# Patient Record
Sex: MALE | Race: WHITE | NOT HISPANIC OR LATINO
[De-identification: names, ages, dates, MRNs, and addresses within clinical notes are randomized per-mention and may not be internally consistent; named-entity substitution may affect disease eponyms.]

---

## 2019-11-01 ENCOUNTER — TRANSCRIPTION ENCOUNTER (OUTPATIENT)
Age: 49
End: 2019-11-01

## 2019-11-07 ENCOUNTER — TRANSCRIPTION ENCOUNTER (OUTPATIENT)
Age: 49
End: 2019-11-07

## 2020-01-22 ENCOUNTER — TRANSCRIPTION ENCOUNTER (OUTPATIENT)
Age: 50
End: 2020-01-22

## 2020-01-25 ENCOUNTER — TRANSCRIPTION ENCOUNTER (OUTPATIENT)
Age: 50
End: 2020-01-25

## 2021-05-05 ENCOUNTER — EMERGENCY (EMERGENCY)
Facility: HOSPITAL | Age: 51
LOS: 1 days | Discharge: HOME | End: 2021-05-05
Attending: EMERGENCY MEDICINE | Admitting: EMERGENCY MEDICINE
Payer: MEDICAID

## 2021-05-05 VITALS
HEIGHT: 75 IN | WEIGHT: 229.94 LBS | HEART RATE: 97 BPM | DIASTOLIC BLOOD PRESSURE: 97 MMHG | OXYGEN SATURATION: 99 % | SYSTOLIC BLOOD PRESSURE: 134 MMHG | TEMPERATURE: 98 F | RESPIRATION RATE: 16 BRPM

## 2021-05-05 LAB
ALBUMIN SERPL ELPH-MCNC: 3.9 G/DL — SIGNIFICANT CHANGE UP (ref 3.3–5)
ALP SERPL-CCNC: 82 U/L — SIGNIFICANT CHANGE UP (ref 40–120)
ALT FLD-CCNC: 44 U/L — SIGNIFICANT CHANGE UP (ref 12–78)
ANION GAP SERPL CALC-SCNC: 5 MMOL/L — SIGNIFICANT CHANGE UP (ref 5–17)
APTT BLD: 31.8 SEC — SIGNIFICANT CHANGE UP (ref 27.5–35.5)
AST SERPL-CCNC: 31 U/L — SIGNIFICANT CHANGE UP (ref 15–37)
BASOPHILS # BLD AUTO: 0.08 K/UL — SIGNIFICANT CHANGE UP (ref 0–0.2)
BASOPHILS NFR BLD AUTO: 0.7 % — SIGNIFICANT CHANGE UP (ref 0–2)
BILIRUB SERPL-MCNC: 0.7 MG/DL — SIGNIFICANT CHANGE UP (ref 0.2–1.2)
BUN SERPL-MCNC: 16 MG/DL — SIGNIFICANT CHANGE UP (ref 7–23)
CALCIUM SERPL-MCNC: 9 MG/DL — SIGNIFICANT CHANGE UP (ref 8.5–10.1)
CHLORIDE SERPL-SCNC: 107 MMOL/L — SIGNIFICANT CHANGE UP (ref 96–108)
CO2 SERPL-SCNC: 29 MMOL/L — SIGNIFICANT CHANGE UP (ref 22–31)
CREAT SERPL-MCNC: 1.4 MG/DL — HIGH (ref 0.5–1.3)
EOSINOPHIL # BLD AUTO: 0.32 K/UL — SIGNIFICANT CHANGE UP (ref 0–0.5)
EOSINOPHIL NFR BLD AUTO: 2.8 % — SIGNIFICANT CHANGE UP (ref 0–6)
GLUCOSE SERPL-MCNC: 126 MG/DL — HIGH (ref 70–99)
HCT VFR BLD CALC: 50.5 % — HIGH (ref 39–50)
HGB BLD-MCNC: 17.5 G/DL — HIGH (ref 13–17)
IMM GRANULOCYTES NFR BLD AUTO: 1.2 % — SIGNIFICANT CHANGE UP (ref 0–1.5)
INR BLD: 1.1 RATIO — SIGNIFICANT CHANGE UP (ref 0.88–1.16)
LYMPHOCYTES # BLD AUTO: 27.9 % — SIGNIFICANT CHANGE UP (ref 13–44)
LYMPHOCYTES # BLD AUTO: 3.22 K/UL — SIGNIFICANT CHANGE UP (ref 1–3.3)
MCHC RBC-ENTMCNC: 30.1 PG — SIGNIFICANT CHANGE UP (ref 27–34)
MCHC RBC-ENTMCNC: 34.7 GM/DL — SIGNIFICANT CHANGE UP (ref 32–36)
MCV RBC AUTO: 86.9 FL — SIGNIFICANT CHANGE UP (ref 80–100)
MONOCYTES # BLD AUTO: 1.08 K/UL — HIGH (ref 0–0.9)
MONOCYTES NFR BLD AUTO: 9.4 % — SIGNIFICANT CHANGE UP (ref 2–14)
NEUTROPHILS # BLD AUTO: 6.69 K/UL — SIGNIFICANT CHANGE UP (ref 1.8–7.4)
NEUTROPHILS NFR BLD AUTO: 58 % — SIGNIFICANT CHANGE UP (ref 43–77)
NRBC # BLD: 0 /100 WBCS — SIGNIFICANT CHANGE UP (ref 0–0)
PLATELET # BLD AUTO: 169 K/UL — SIGNIFICANT CHANGE UP (ref 150–400)
POTASSIUM SERPL-MCNC: 4.1 MMOL/L — SIGNIFICANT CHANGE UP (ref 3.5–5.3)
POTASSIUM SERPL-SCNC: 4.1 MMOL/L — SIGNIFICANT CHANGE UP (ref 3.5–5.3)
PROT SERPL-MCNC: 8.3 G/DL — SIGNIFICANT CHANGE UP (ref 6–8.3)
PROTHROM AB SERPL-ACNC: 12.8 SEC — SIGNIFICANT CHANGE UP (ref 10.6–13.6)
RBC # BLD: 5.81 M/UL — HIGH (ref 4.2–5.8)
RBC # FLD: 13.4 % — SIGNIFICANT CHANGE UP (ref 10.3–14.5)
SODIUM SERPL-SCNC: 141 MMOL/L — SIGNIFICANT CHANGE UP (ref 135–145)
WBC # BLD: 11.53 K/UL — HIGH (ref 3.8–10.5)
WBC # FLD AUTO: 11.53 K/UL — HIGH (ref 3.8–10.5)

## 2021-05-05 PROCEDURE — 99284 EMERGENCY DEPT VISIT MOD MDM: CPT | Mod: 25

## 2021-05-05 PROCEDURE — 30903 CONTROL OF NOSEBLEED: CPT

## 2021-05-05 RX ORDER — TRANEXAMIC ACID 100 MG/ML
5 INJECTION, SOLUTION INTRAVENOUS ONCE
Refills: 0 | Status: COMPLETED | OUTPATIENT
Start: 2021-05-05 | End: 2021-05-05

## 2021-05-05 RX ADMIN — TRANEXAMIC ACID 5 MILLILITER(S): 100 INJECTION, SOLUTION INTRAVENOUS at 23:50

## 2021-05-05 RX ADMIN — TRANEXAMIC ACID 5 MILLILITER(S): 100 INJECTION, SOLUTION INTRAVENOUS at 23:05

## 2021-05-05 NOTE — ED PROCEDURE NOTE - ATTENDING CONTRIBUTION TO CARE
49 yo male hx of asthma, hypothyroid, ocd, anxiety, and depression BIBEMS for epistaxis, started around 6pm while showering, placed afrin soaked cotton balls PTA without much relief.  No dizziness, no sob.  No syncope.      Gen: Alert, NAD  Head/eyes: NC/AT, PERRL  ENT: airway patent, +b/l nare epistaxis, copious amounts of bleeding, unable to identify specific source in nare  Neck: supple, no tenderness/meningismus/JVD, Trachea midline  Pulm/lung: Bilateral clear BS, normal resp effort, no wheeze/stridor/retractions  CV/heart: RRR, no M/R/G, +2 dist pulses (radial, pedal DP/PT, popliteal)  GI/Abd: soft, NT/ND, +BS, no guarding/rebound tenderness  Musculoskeletal: no edema/erythema/cyanosis, FROM in all extremities, no C/T/L spine ttp  Skin: no rash, no vesicles, no petechaie, no ecchymosis, no swelling  Neuro: AAOx3, CN 2-12 intact, normal sensation, 5/5 motor strength in all extremities, normal gait    left nostril packed with rhino rocket, pt unable to tolerate right nare, currently no active bleeding after left nare packing, PO abx f/u with ENT

## 2021-05-05 NOTE — ED ADULT NURSE NOTE - OBJECTIVE STATEMENT
49 y/o M patient presents to ED from home via EMS c/o epistaxis today. As per patient he was showering when he had sudden nosebleed which worsened over the next few hours. Patient reports he began having nosebleeds after his first covid vaccine last month and has seen ENT twice and was cauterized. As per patient second covid vaccine dose was last Thursday. Patient A&Ox4. Patient denies HA, SOB, chest pain, abdominal pain, N/V/D. Safety and comfort measures provided and maintained.

## 2021-05-05 NOTE — ED ADULT TRIAGE NOTE - CHIEF COMPLAINT QUOTE
Patient brought in by ambulance from home as reported had epistaxis 30 mins ago was the second today ; 3 times he had epistaxis in a month was seen by ENT and cauterized at that time.

## 2021-05-06 VITALS
RESPIRATION RATE: 16 BRPM | DIASTOLIC BLOOD PRESSURE: 71 MMHG | TEMPERATURE: 98 F | HEART RATE: 86 BPM | SYSTOLIC BLOOD PRESSURE: 131 MMHG | OXYGEN SATURATION: 97 %

## 2021-05-06 PROCEDURE — 99284 EMERGENCY DEPT VISIT MOD MDM: CPT | Mod: 25

## 2021-05-06 PROCEDURE — 85610 PROTHROMBIN TIME: CPT

## 2021-05-06 PROCEDURE — 36415 COLL VENOUS BLD VENIPUNCTURE: CPT

## 2021-05-06 PROCEDURE — 30903 CONTROL OF NOSEBLEED: CPT | Mod: 50

## 2021-05-06 PROCEDURE — 85025 COMPLETE CBC W/AUTO DIFF WBC: CPT

## 2021-05-06 PROCEDURE — 80053 COMPREHEN METABOLIC PANEL: CPT

## 2021-05-06 PROCEDURE — 85730 THROMBOPLASTIN TIME PARTIAL: CPT

## 2021-05-06 RX ORDER — AZTREONAM 2 G
1 VIAL (EA) INJECTION
Qty: 10 | Refills: 0
Start: 2021-05-06 | End: 2021-05-10

## 2021-05-06 RX ORDER — SODIUM CHLORIDE 9 MG/ML
1000 INJECTION INTRAMUSCULAR; INTRAVENOUS; SUBCUTANEOUS ONCE
Refills: 0 | Status: COMPLETED | OUTPATIENT
Start: 2021-05-06 | End: 2021-05-06

## 2021-05-06 RX ADMIN — SODIUM CHLORIDE 1000 MILLILITER(S): 9 INJECTION INTRAMUSCULAR; INTRAVENOUS; SUBCUTANEOUS at 00:21

## 2021-05-06 RX ADMIN — Medication 1 TABLET(S): at 01:31

## 2021-05-06 NOTE — ED PROVIDER NOTE - PATIENT PORTAL LINK FT
You can access the FollowMyHealth Patient Portal offered by [facility] by registering at the following website: [email or web address]. By joining [employer]’s FollowMyHealth portal, you will also be able to view your health information using other applications (apps) compatible with our system.

## 2021-05-06 NOTE — ED PROVIDER NOTE - OBJECTIVE STATEMENT
50 year old male with history of asthma, hypothyroid, OCD, anxiety, and depression BIBA for nosebleed. reports nosebleed started today at 6:00 in evening while in shower. applied afrin and cotton to bilateral nares. took cotton out just PTA with continued to bleed. reports both nares, but left worse than right. no injury or trauma. reports 3 other episodes of epistaxis this month. seen by ENT ([facility]) last month and a week ago and was cauterized in office. patient reports each nosebleed started a few days after covid vaccinations, unsure if related.   PCP Brenna Johansen  ENT [facility]

## 2021-05-06 NOTE — ED PROVIDER NOTE - CARE PROVIDER_API CALL
Srinivas Clayton  OTOLARYNGOLOGY  [address] [place] [address], [facility] [address]  [place], [place] [id number]  Phone: [phone]  Fax: [phone]  Follow Up Time: 4-6 Days

## 2021-05-06 NOTE — ED PROVIDER NOTE - PROGRESS NOTE DETAILS
TXA was initially applied to cotton and applied to both nares with continued bleeding. bilateral nares were packed with rhino-rocket (with TXA). bleeding was controlled. about 10 min later, reports diff breathing due to packing in both nares and felt lightheaded. right rhino rocket was removed and IVF started. patient felt improved. no active bleeding from right nares. will continue to monitor in ED. Dr. Marie will assume care no acute bleeding feeling well, awaiting for morning for a ride back home feels well wants to go home will f/u with Dr. Clayton

## 2021-05-06 NOTE — ED PROVIDER NOTE - PMH
Anxiety    Asthma    Depression    Depression    Hypothyroid    Hypothyroidism    OCD (obsessive compulsive disorder)    OCD (obsessive compulsive disorder)

## 2021-05-06 NOTE — ED PROVIDER NOTE - NSFOLLOWUPINSTRUCTIONS_ED_ALL_ED_FT
keep packing in place until seen by ENT  follow up with ENT 1-2 days  bactrim twice a day for 5 days       Nosebleed    WHAT YOU NEED TO KNOW:    A nosebleed, or epistaxis, occurs when one or more of the blood vessels in your nose break. You may have dark or bright red blood from one or both nostrils. A nosebleed is most commonly caused by dry air or picking your nose. A direct blow to your nose, irritation from a cold or allergies, or a foreign object can also cause a nosebleed.     DISCHARGE INSTRUCTIONS:    Return to the emergency department if:   •Your nasal packing is soaked with blood.      •Your nose is still bleeding after 20 minutes, even after you pinch it.       •You have a foul-smelling discharge coming out of your nose.      •You feel so weak and dizzy that you have trouble standing up.      •You have trouble breathing or talking.       Contact your healthcare provider if:   •You have a fever and are vomiting.      •You have pain in and around your nose that is getting worse even after you take pain medicines.      •Your nasal pack is loose.      •You have questions or concerns about your condition or care.      First aid:   •Sit up and lean forward. This will help prevent you from swallowing blood. Spit blood and saliva into a bowl.       •Apply pressure to your nose. Use 2 fingers to pinch your nose shut for 10       •Apply ice on the bridge of your nose to decrease swelling and bleeding. Use a cold pack or put crushed ice in a plastic bag. Cover it with a towel to protect your skin.      •Pack your nose with a cotton ball, tissue, tampon, or gauze bandage to stop the bleeding.      Medicines:   •Medicines applied to a small piece of cotton and placed in your nose. Medicine may also be sprayed in or applied directly to your nose. You may need medicine to prevent an infection. If bleeding is severe, medicine may be injected into a blood vessel in your nose.       •Take your medicine as directed. Contact your healthcare provider if you think your medicine is not helping or if you have side effects. Tell him of her if you are allergic to any medicine. Keep a list of the medicines, vitamins, and herbs you take. Include the amounts, and when and why you take them. Bring the list or the pill bottles to follow-up visits. Carry your medicine list with you in case of an emergency.      Prevent another nosebleed:   •Keep your nose moist. Put a small amount of petroleum jelly inside your nostrils as needed. Use a saline (saltwater) nasal spray. Do not put anything else inside your nose unless your healthcare provider says it is okay. Do not use oil-based lubricants if you use oxygen therapy. They may be flammable.      •Use a cool mist humidifier to increase air moisture in your home. This will help your nose stay moist.       •Do not pick or blow your nose for at least a week. You can irritate or damage your nose if you pick it. Blowing your nose too hard may cause the bleeding to start again. Do not bend over or strain as this can cause the bleeding to start again.      •Avoid irritants such as tobacco smoke or chemical sprays such as .      Follow up with your healthcare provider as directed: Any packing in your nose should be removed within 2 to 3 days. Write down your questions so you remember to ask them during your visits.

## 2021-05-06 NOTE — ED ADULT NURSE REASSESSMENT NOTE - NS ED NURSE REASSESS COMMENT FT1
Patient resting comfortably in bed. Patient deneis pain/discomfort at this time. Patient to be reassesed by MD Marie for possible discharge in AM.

## 2021-05-06 NOTE — ED PROVIDER NOTE - ENMT, MLM
Airway patent, +bleeding from both nares (left worse than right). unable to visualize bleeding source due to continued bleeding. Throat has no vesicles, no oropharyngeal exudates and uvula is midline.

## 2021-05-06 NOTE — ED PROVIDER NOTE - ATTENDING CONTRIBUTION TO CARE
51 yo male hx of asthma, hypothyroid, ocd, anxiety, and depression BIBEMS for epistaxis, started around 6pm while showering, placed afrin soaked cotton balls 49 yo male hx of asthma, hypothyroid, ocd, anxiety, and depression BIBEMS for epistaxis, started around 6pm while showering, placed afrin soaked cotton balls PTA without much relief.  No dizziness, no sob.  No syncope.      Gen: Alert, NAD  Head/eyes: NC/AT, PERRL  ENT: airway patent, +b/l nare epistaxis, copious amounts of bleeding, unable to identify specific source in nare  Neck: supple, no tenderness/meningismus/JVD, Trachea midline  Pulm/lung: Bilateral clear BS, normal resp effort, no wheeze/stridor/retractions  CV/heart: RRR, no M/R/G, +2 dist pulses (radial, pedal DP/PT, popliteal)  GI/Abd: soft, NT/ND, +BS, no guarding/rebound tenderness  Musculoskeletal: no edema/erythema/cyanosis, FROM in all extremities, no C/T/L spine ttp  Skin: no rash, no vesicles, no petechaie, no ecchymosis, no swelling  Neuro: AAOx3, CN 2-12 intact, normal sensation, 5/5 motor strength in all extremities, normal gait    left nostril packed with rhino rocket, pt unable to tolerate right nare, currently no active bleeding after left nare packing, PO abx f/u with ENT

## 2021-05-06 NOTE — ED PROVIDER NOTE - CLINICAL SUMMARY MEDICAL DECISION MAKING FREE TEXT BOX
bleeding from bilateral nares today. history of 3 episodes this month. will check labs to eval for anemia and thrombocytopenia. will pack with rhino-rocket to control the bleeding, ENT follow up

## 2021-09-07 ENCOUNTER — TRANSCRIPTION ENCOUNTER (OUTPATIENT)
Age: 51
End: 2021-09-07

## 2021-10-28 PROBLEM — J45.909 UNSPECIFIED ASTHMA, UNCOMPLICATED: Chronic | Status: ACTIVE | Noted: 2021-05-05

## 2021-12-21 ENCOUNTER — APPOINTMENT (OUTPATIENT)
Dept: GASTROENTEROLOGY | Facility: CLINIC | Age: 51
End: 2021-12-21

## 2022-03-11 ENCOUNTER — TRANSCRIPTION ENCOUNTER (OUTPATIENT)
Age: 52
End: 2022-03-11

## 2022-04-09 ENCOUNTER — EMERGENCY (EMERGENCY)
Facility: HOSPITAL | Age: 52
LOS: 1 days | Discharge: HOME | End: 2022-04-09
Attending: EMERGENCY MEDICINE | Admitting: EMERGENCY MEDICINE
Payer: MEDICAID

## 2022-04-09 VITALS
RESPIRATION RATE: 16 BRPM | WEIGHT: 210.98 LBS | HEIGHT: 75 IN | TEMPERATURE: 97 F | SYSTOLIC BLOOD PRESSURE: 148 MMHG | DIASTOLIC BLOOD PRESSURE: 95 MMHG | HEART RATE: 81 BPM

## 2022-04-09 VITALS
RESPIRATION RATE: 16 BRPM | HEART RATE: 78 BPM | OXYGEN SATURATION: 98 % | TEMPERATURE: 98 F | DIASTOLIC BLOOD PRESSURE: 70 MMHG | SYSTOLIC BLOOD PRESSURE: 120 MMHG

## 2022-04-09 LAB
ALBUMIN SERPL ELPH-MCNC: 3.5 G/DL — SIGNIFICANT CHANGE UP (ref 3.3–5)
ALP SERPL-CCNC: 103 U/L — SIGNIFICANT CHANGE UP (ref 40–120)
ALT FLD-CCNC: 32 U/L — SIGNIFICANT CHANGE UP (ref 12–78)
ANION GAP SERPL CALC-SCNC: 4 MMOL/L — LOW (ref 5–17)
APTT BLD: 34.1 SEC — SIGNIFICANT CHANGE UP (ref 27.5–35.5)
AST SERPL-CCNC: 13 U/L — LOW (ref 15–37)
BASOPHILS # BLD AUTO: 0.06 K/UL — SIGNIFICANT CHANGE UP (ref 0–0.2)
BASOPHILS NFR BLD AUTO: 0.6 % — SIGNIFICANT CHANGE UP (ref 0–2)
BILIRUB SERPL-MCNC: 0.6 MG/DL — SIGNIFICANT CHANGE UP (ref 0.2–1.2)
BUN SERPL-MCNC: 20 MG/DL — SIGNIFICANT CHANGE UP (ref 7–23)
CALCIUM SERPL-MCNC: 9.2 MG/DL — SIGNIFICANT CHANGE UP (ref 8.5–10.1)
CHLORIDE SERPL-SCNC: 110 MMOL/L — HIGH (ref 96–108)
CO2 SERPL-SCNC: 29 MMOL/L — SIGNIFICANT CHANGE UP (ref 22–31)
CREAT SERPL-MCNC: 1.3 MG/DL — SIGNIFICANT CHANGE UP (ref 0.5–1.3)
EGFR: 66 ML/MIN/1.73M2 — SIGNIFICANT CHANGE UP
EOSINOPHIL # BLD AUTO: 0.24 K/UL — SIGNIFICANT CHANGE UP (ref 0–0.5)
EOSINOPHIL NFR BLD AUTO: 2.5 % — SIGNIFICANT CHANGE UP (ref 0–6)
GLUCOSE SERPL-MCNC: 106 MG/DL — HIGH (ref 70–99)
HCT VFR BLD CALC: 45.3 % — SIGNIFICANT CHANGE UP (ref 39–50)
HGB BLD-MCNC: 15.7 G/DL — SIGNIFICANT CHANGE UP (ref 13–17)
IMM GRANULOCYTES NFR BLD AUTO: 0.7 % — SIGNIFICANT CHANGE UP (ref 0–1.5)
INR BLD: 1.11 RATIO — SIGNIFICANT CHANGE UP (ref 0.88–1.16)
LYMPHOCYTES # BLD AUTO: 1.7 K/UL — SIGNIFICANT CHANGE UP (ref 1–3.3)
LYMPHOCYTES # BLD AUTO: 17.6 % — SIGNIFICANT CHANGE UP (ref 13–44)
MCHC RBC-ENTMCNC: 30.3 PG — SIGNIFICANT CHANGE UP (ref 27–34)
MCHC RBC-ENTMCNC: 34.7 GM/DL — SIGNIFICANT CHANGE UP (ref 32–36)
MCV RBC AUTO: 87.5 FL — SIGNIFICANT CHANGE UP (ref 80–100)
MONOCYTES # BLD AUTO: 0.68 K/UL — SIGNIFICANT CHANGE UP (ref 0–0.9)
MONOCYTES NFR BLD AUTO: 7.1 % — SIGNIFICANT CHANGE UP (ref 2–14)
NEUTROPHILS # BLD AUTO: 6.89 K/UL — SIGNIFICANT CHANGE UP (ref 1.8–7.4)
NEUTROPHILS NFR BLD AUTO: 71.5 % — SIGNIFICANT CHANGE UP (ref 43–77)
NRBC # BLD: 0 /100 WBCS — SIGNIFICANT CHANGE UP (ref 0–0)
PLATELET # BLD AUTO: 118 K/UL — LOW (ref 150–400)
POTASSIUM SERPL-MCNC: 4.4 MMOL/L — SIGNIFICANT CHANGE UP (ref 3.5–5.3)
POTASSIUM SERPL-SCNC: 4.4 MMOL/L — SIGNIFICANT CHANGE UP (ref 3.5–5.3)
PROT SERPL-MCNC: 7.1 G/DL — SIGNIFICANT CHANGE UP (ref 6–8.3)
PROTHROM AB SERPL-ACNC: 13 SEC — SIGNIFICANT CHANGE UP (ref 10.5–13.4)
RBC # BLD: 5.18 M/UL — SIGNIFICANT CHANGE UP (ref 4.2–5.8)
RBC # FLD: 13 % — SIGNIFICANT CHANGE UP (ref 10.3–14.5)
SODIUM SERPL-SCNC: 143 MMOL/L — SIGNIFICANT CHANGE UP (ref 135–145)
WBC # BLD: 9.64 K/UL — SIGNIFICANT CHANGE UP (ref 3.8–10.5)
WBC # FLD AUTO: 9.64 K/UL — SIGNIFICANT CHANGE UP (ref 3.8–10.5)

## 2022-04-09 PROCEDURE — 80053 COMPREHEN METABOLIC PANEL: CPT

## 2022-04-09 PROCEDURE — 85025 COMPLETE CBC W/AUTO DIFF WBC: CPT

## 2022-04-09 PROCEDURE — 85730 THROMBOPLASTIN TIME PARTIAL: CPT

## 2022-04-09 PROCEDURE — 30903 CONTROL OF NOSEBLEED: CPT

## 2022-04-09 PROCEDURE — 99283 EMERGENCY DEPT VISIT LOW MDM: CPT | Mod: 25

## 2022-04-09 PROCEDURE — 36415 COLL VENOUS BLD VENIPUNCTURE: CPT

## 2022-04-09 PROCEDURE — 99284 EMERGENCY DEPT VISIT MOD MDM: CPT | Mod: 25

## 2022-04-09 PROCEDURE — 85610 PROTHROMBIN TIME: CPT

## 2022-04-09 PROCEDURE — 30901 CONTROL OF NOSEBLEED: CPT | Mod: 50

## 2022-04-09 RX ORDER — FAMOTIDINE 10 MG/ML
0 INJECTION INTRAVENOUS
Qty: 0 | Refills: 0 | DISCHARGE

## 2022-04-09 RX ORDER — OXYMETAZOLINE HYDROCHLORIDE 0.5 MG/ML
2 SPRAY NASAL ONCE
Refills: 0 | Status: DISCONTINUED | OUTPATIENT
Start: 2022-04-09 | End: 2022-04-12

## 2022-04-09 RX ORDER — PANTOPRAZOLE SODIUM 20 MG/1
0 TABLET, DELAYED RELEASE ORAL
Qty: 0 | Refills: 0 | DISCHARGE

## 2022-04-09 RX ORDER — VORTIOXETINE 5 MG/1
0 TABLET, FILM COATED ORAL
Qty: 0 | Refills: 0 | DISCHARGE

## 2022-04-09 RX ORDER — FLUTICASONE FUROATE AND VILANTEROL TRIFENATATE 100; 25 UG/1; UG/1
0 POWDER RESPIRATORY (INHALATION)
Qty: 0 | Refills: 0 | DISCHARGE

## 2022-04-09 RX ORDER — TRANEXAMIC ACID 100 MG/ML
5 INJECTION, SOLUTION INTRAVENOUS ONCE
Refills: 0 | Status: DISCONTINUED | OUTPATIENT
Start: 2022-04-09 | End: 2022-04-12

## 2022-04-09 NOTE — ED PROVIDER NOTE - NSFOLLOWUPINSTRUCTIONS_ED_ALL_ED_FT
NO NOSE PICKING NO NOSE BLOWING NO HOT STEAMY BEVERAGES  NO SPORTS NO GYM NO STRAINING  PROMPT RE-EVALUATION BY YOUR ENT OR DR NDIAYE ENT ON CALL  RETURN FOR WORSENING SYMPTOMS DRAINAGE PUS FEVER PAIN BLEEDING    Nosebleed, Adult      A nosebleed is when blood comes out of the nose. Nosebleeds are common and can be caused by many things. They are usually not a sign of a serious medical problem.      Follow these instructions at home:      When you have a nosebleed:      •Sit down.      •Tilt your head a little forward.    •Follow these steps:  1.Pinch your nose with a clean towel or tissue.      2.Keep pinching your nose for 5 minutes. Do not let go.      3.After 5 minutes, let go of your nose.      4.If there is still bleeding, do these steps again. Keep doing these steps until the bleeding stops.        • Do not put tissues or other things in your nose to stop the bleeding.      •Avoid lying down or putting your head back.      •Use a nose spray decongestant as told by your doctor.      After a nosebleed:     •Try not to blow your nose or sniffle for several hours.      •Try not to strain, lift, or bend at the waist for several days.    •Aspirin and blood-thinning medicines make bleeding more likely. If you take these medicines:  •Ask your doctor if you should stop taking them or if you should change how much you take.      •Do not stop taking the medicine unless your doctor tells you to.      •If your nosebleed was caused by dryness, use over-the-counter saline nasal spray or gel and humidifier as told by your doctor. This will keep the inside of your nose moist and allow it to heal. If you need to use one of these products:  •Choose one that is water-soluble.       •Use only as much as you need and use it only as often as needed.       •Do not lie down right away after you use it.      •If you get nosebleeds often, talk with your doctor about treatments. These may include:  •Nasal cautery. A chemical swab or electrical device is used to lightly burn tiny blood vessels inside the nose. This helps stop or prevent nosebleeds.      •Nasal packing. A gauze or other material is placed in the nose to keep constant pressure on the bleeding area.          Contact a doctor if:    •You have a fever.      •You get nosebleeds often.      •You are getting nosebleeds more often than usual.      •You bruise very easily.      •You have something stuck in your nose.      •You have bleeding in your mouth.      •You vomit or cough up brown material.      •You get a nosebleed after you start a new medicine.        Get help right away if:    •You have a nosebleed after you fall or hurt your head.      •Your nosebleed does not go away after 20 minutes.      •You feel dizzy or weak.      •You have unusual bleeding from other parts of your body.      •You have unusual bruising on other parts of your body.      •You get sweaty.      •You vomit blood.        Summary    •Nosebleeds are common. They are usually not a sign of a serious medical problem.      •When you have a nosebleed, sit down and tilt your head a little forward. Pinch your nose with a clean tissue for 5 minutes.      •Use saline spray or saline gel and a humidifier as told by your doctor.      •Get help right away if your nosebleed does not go away after 20 minutes.      This information is not intended to replace advice given to you by your health care provider. Make sure you discuss any questions you have with your health care provider.

## 2022-04-09 NOTE — ED PROVIDER NOTE - PROGRESS NOTE DETAILS
no further bleeding, pt has sensation of fb in r nares, on re evaluation, suctioning nose blowing and instillation of afrin there is no obstn, no fb seen on speculum, or other visulization pt counselled on monit for fb nad to follow up with ent

## 2022-04-09 NOTE — ED PROVIDER NOTE - CARE PROVIDER_API CALL
Salvador Robertson)  Otolaryngology  [address] [facility], [address]  [place] [address]  Phone: [phone]  Fax: [phone]  Follow Up Time:

## 2022-04-09 NOTE — ED PROVIDER NOTE - OBJECTIVE STATEMENT
Pt is a 50 yo male who presents to the ED with a cc of epistaxis. PMHx of Anxiety, Asthma, Depression, Hypothyroid, OCD (obsessive compulsive disorder). Pt reports that approx 1 year ago he developed bilateral epistaxis. He believes that this may have been related to his Moderna shots. He was seen in the ED and then followed up with ENT. Pt reports that symptoms seemed to improve and then last Tuesday pt developed bilateral epistaxis again. Pt was seen once in the ED but the bleeding stopped prior to intervention. Pt then followed up with the ENT on Thursday and reports that he cauterized the right nare and had mesh placed in both nostrils. Pt reports that yesterday he also had a bilateral epistaxis he soaked 2 cotton balls in Afrin and it stopped. Today around 5:30 am he again reports bleeding from bilateral nares which woke him from sleep. Pt reports that he again soaked cotton balls in Afrin initially seemed to help but then the bleeding again picked up. Pt reports that his brother has hemochromatosis he has one of the genes and is currently following with hematology for further work up.     Hematology Jamie Pt is a 52 yo male who presents to the ED with a cc of epistaxis. PMHx of Anxiety, Asthma, Depression, Hypothyroid, OCD (obsessive compulsive disorder). Pt reports that approx 1 year ago he developed bilateral epistaxis. He believes that this may have been related to his Moderna shots. He was seen in the ED and then followed up with ENT. Pt reports that symptoms seemed to improve and then last Tuesday pt developed bilateral epistaxis again. Pt was seen once in the ED but the bleeding stopped prior to intervention. Pt then followed up with the ENT on Thursday and reports that he cauterized the right nare and had mesh placed in both nostrils. Pt reports that yesterday he also had a bilateral epistaxis he soaked 2 cotton balls in Afrin and it stopped. Today around 5:30 am he again reports bleeding from bilateral nares which woke him from sleep. Pt reports that he again soaked cotton balls in Afrin initially seemed to help but then the bleeding again picked up. Pt reports that his brother has hemochromatosis he has one of the genes and is currently following with hematology for further work up. Denies CP, SOB, abd pain, ext numbness or weakness     Hematology Ayala

## 2022-04-09 NOTE — ED PROVIDER NOTE - PATIENT PORTAL LINK FT
You can access the FollowMyHealth Patient Portal offered by [facility] by registering at the following website: [email or web address]. By joining [employer]’s FollowMyHealth portal, you will also be able to view your health information using other applications (apps) compatible with our system.

## 2022-04-09 NOTE — ED PROCEDURE NOTE - CPROC ED NASAL DETAIL1
txa/The source of the bleeding was clearly identified./The anatomic location was packed./The area was cauterized with silver nitrate./The bleeding stopped.

## 2022-04-09 NOTE — ED ADULT NURSE NOTE - CHIEF COMPLAINT QUOTE
Uploading outside records      
Patient brought in by ambulance from home as reported had epistaxis started 45 mins ago

## 2022-04-09 NOTE — ED PROVIDER NOTE - NSICDXPASTMEDICALHX_GEN_ALL_CORE_FT
PAST MEDICAL HISTORY:  Anxiety     Asthma     Depression     Depression     Hypothyroid     Hypothyroidism     OCD (obsessive compulsive disorder)     OCD (obsessive compulsive disorder)

## 2022-04-09 NOTE — ED PROVIDER NOTE - CLINICAL SUMMARY MEDICAL DECISION MAKING FREE TEXT BOX
pt presenting to the ED with bilateral epistaxis, epistaxis noted from bilateral nares, controlled with pressure. Due to reported heme issues will obtain screening labs. Will apply soaked TXA/lido/epi gauze in nares and will monitor    Case taken over by Dr. Doran

## 2022-04-10 ENCOUNTER — EMERGENCY (EMERGENCY)
Facility: HOSPITAL | Age: 52
LOS: 1 days | Discharge: HOME | End: 2022-04-10
Attending: EMERGENCY MEDICINE | Admitting: EMERGENCY MEDICINE
Payer: MEDICAID

## 2022-04-10 VITALS
TEMPERATURE: 98 F | SYSTOLIC BLOOD PRESSURE: 125 MMHG | HEART RATE: 85 BPM | OXYGEN SATURATION: 100 % | RESPIRATION RATE: 18 BRPM | HEIGHT: 75 IN | DIASTOLIC BLOOD PRESSURE: 81 MMHG

## 2022-04-10 PROCEDURE — 99284 EMERGENCY DEPT VISIT MOD MDM: CPT

## 2022-04-10 RX ORDER — SODIUM CHLORIDE 0.65 %
2 AEROSOL, SPRAY (ML) NASAL ONCE
Refills: 0 | Status: DISCONTINUED | OUTPATIENT
Start: 2022-04-10 | End: 2022-04-10

## 2022-04-10 NOTE — ED ADULT TRIAGE NOTE - CHIEF COMPLAINT QUOTE
Pt c/o intermittent nosebleed x 5 days, seen multiple times with right side cauterization yesterday. Bleeding presently controlled. PMH depression, GERD, hypothyroid, asthma

## 2022-04-10 NOTE — ED PROVIDER NOTE - OBJECTIVE STATEMENT
52 y/o M w/ PMHx GERD, depression, asthma, s/p cholecystectomy and appendectomy, remote nasal polyp removal and deviated septum correction presents to the ED w/ persistent nose bleeds over the past week, currently controlled. Reports has been to 3 EDs for these nose bleeds as well as an ENT doctor and has had cautery of both nostrils, mesh placement by ENT and has been attempted to manage by himself w/ afrin soaked cotton balls and direct pressure at home. Pt has had long periods of hemostasis followed by epistaxis. Reports he feels like it has effected his functionality, cant sleep or take care of his mother. Denies trauma to nose, foreign objects except cotton balls. Reports he was worked up for multiple myeloma and hemochromatosis. Denies tobacco, ETOH, or illicit drug use. No hx of snorting drugs. Denies fever, chills or headache. Does report occasional lightheadedness.

## 2022-04-10 NOTE — ED PROVIDER NOTE - ATTENDING CONTRIBUTION TO CARE
I performed a face to face evaluation of this patient and performed a full history and physical examination on the patient.  I agree with the resident's history, physical examination, and plan of the patient.  52 y/o M w/ no hx of thrombophilia presents to the ED w/ recurrent nose bleeds for 1 week. Feels it is exacerbating and is unable to take care of himself at home because of the nose bleeds. Pt is already followed by ENT (Dr. Pack) and has still has bleeding. No signs of anemia, no history of thrombocytopenia or hemophilia. On exam pt otherwise well appearing, current hemostatic, no epistaxis. Given duration of symptoms and course of events will consult ENT for second opinion. Currently no acute intervention required. Lab work from yesterday with normal cbc, normal coags and lytes, will defer labs and imaging until ENT recs  Left nares with mesh at septum, small area, right nares inflamed, no active blleding.  OP wnl

## 2022-04-10 NOTE — ED PROVIDER NOTE - PHYSICAL EXAMINATION
GENERAL: non-toxic appearing, alert, in NAD. Speaking full sentences, normal color w/ cotton balls in nose covered in blood.  HEENT: atraumatic, normocephalic, Vision grossly intact, EOMI no conjunctivitis or discharge, hearing grossly intact,   R nare w/ red beefy medial septum and dried blood no active hemorrhage   L medial septum w low turbinate mesh hemostatic   oropharynx clear w/ uvula midline no blood clots present. no mucosal pallor    CARDIAC: RRR, normal S1S2,  no appreciable murmurs, no cyanosis, cap refill < 2 seconds  PULM: normal work of breathing, oxygen saturation on RA wnl, CTAB, no crackles, rales, rhonchi, or wheezing  GI: abdomen nondistended, soft, nontender, no guarding or rebound tenderness, no palpable masses  NEURO: awake and alert, follows commands, normal speech, PERRLA, EOMI, no focal motor or sensory deficits  MSK: spine appears normal, no joint swelling or erythema, ranging all extremities with no appreciable loss of ROM  EXT: no peripheral edema, calf tenderness, redness or swelling  SKIN: warm, dry, and intact, no rashes  PSYCH: appropriate mood and affect Denies SI or HI

## 2022-04-10 NOTE — CONSULT NOTE ADULT - SUBJECTIVE AND OBJECTIVE BOX
HPI: 51y Male with M w/ PMHx GERD, depression, asthma, s/p cholecystectomy and appendectomy, prior sinus surgery and septoplasty presents to the ED w/ nose bleeds over the past week. Pt reports has been to 3 EDs for these nose bleeds and has recently been seen by outside ENT as well. He states that at the outside ENT, he was cauterized on the right side and was made a follow up appt. Pt was seen in [facility] ED yesterday and he stated that his left nostril was cauterized as well surgicel placement. Pt stated that this morning, he started to have oozing from the left side and decided to come to the ED for evaluation. He stated that his nose bleeds started approx 1 year ago and he states that it is sometimes on the left and sometimes on the right. Denied instrumenting the nsoe. States he intermittently uses juan ramon pots but not consistently. He denied hx of cocaine use. Reports that he is being worked up for possible light chain dysfunction as well as hemochromotosis. The nose bleeds are usually controlled with afrin and pressure.     Allergies    Gluten (Rash)  PC Pen VK (Other)  penicillin (Angioedema)    Intolerances        PAST MEDICAL & SURGICAL HISTORY:  Depression    OCD (obsessive compulsive disorder)    Hypothyroidism    Depression    Anxiety    OCD (obsessive compulsive disorder)    Hypothyroid    Asthma    No significant past surgical history    Vital Signs Last 24 Hrs  T(C): 36.5 (10 Apr 2022 11:26), Max: 36.5 (10 Apr 2022 11:26)  T(F): 97.7 (10 Apr 2022 11:26), Max: 97.7 (10 Apr 2022 11:26)  HR: 85 (10 Apr 2022 11:26) (85 - 85)  BP: 125/81 (10 Apr 2022 11:26) (125/81 - 125/81)  BP(mean): --  RR: 18 (10 Apr 2022 11:26) (18 - 18)  SpO2: 100% (10 Apr 2022 11:26) (100% - 100%)    LABS:  CBC-    04-09    143  |  110<H>  |  20  ----------------------------<  106<H>  4.4   |  29  |  1.30    Ca    9.2      09 Apr 2022 07:56    TPro  7.1  /  Alb  3.5  /  TBili  0.6  /  DBili  x   /  AST  13<L>  /  ALT  32  /  AlkPhos  103  04-09    Coagulation Studies-  PT/INR - ( 09 Apr 2022 07:56 )   PT: 13.0 sec;   INR: 1.11 ratio         PTT - ( 09 Apr 2022 07:56 )  PTT:34.1 sec  Endocrine Panel-  --  --  9.2 mg/dL        PHYSICAL EXAM:    ENT EXAM-   Constitutional: Well-developed, well-nourished.  No hoarseness.     Head:  normocephalic, atraumatic.   Nose: Left nasal septum with severe amount of crusting. Right side with moderate amount of crusting. No active bleeding. Small amount of dried blood in the left nasal cavity. No obvious vessels seen  OC/OP:  Floor of mouth, buccal mucosa, lips, hard palate, soft palate, uvula, posterior pharyngeal wall normal.  Mucosa moist.  Neck:  Trachea midline.  Thyroid, parotid and submandibular glands normal.  Lymph:  No cervical adenopathy.  Facial Plastics:     MULTISYSTEM EXAM-  Neuro/Psych:  A&O x 3.  Mood stable.  Affect bright.  Cranial nerves: 2-12 grossly intact bilaterally.  Eyes:  EOMI, no nystagmus.  Pulm:  No dyspnea, non-labored breathing  Cardiovascular: Carotid pulses 2+ bilaterally.  No peripheral edema.  Skin:  No rash or lesions on exposed skin of head/neck    Assessment/Plan:  51y Male with intermittent epistaxis over the past year that occurs in both nostrils that seems low volume in nature but is persistent and bothersome with physcial exam showing no active bleeding and severe amount of crusting noted L>R.    - No acute ENT intervention  - Recommend preventative measures that includes:  - AYR nasal saline gel in both sides BID (over the counter)  - Neilmed sinus rinses (over the counter) three times daily  - Nasal saline sprays BID (over the counter)  - Afrin two sprays in each nostril BID for the next 2 days, and then stop  - Follow up with outpatient ENT

## 2022-04-10 NOTE — ED PROVIDER NOTE - NSFOLLOWUPINSTRUCTIONS_ED_ALL_ED_FT
You were evaluated today for a chronic nose bleed. Our ENT team saw you I the ER and have made recommendations for outpatient follow up     - Please f/u with your ENT this Thursday as discussed  - Use afrin twice a day (2 sprays in each nostril) for the next 2 days  - Use AYR saline gel twice a day (over-the-counter)  - Use Neilmen sinus rinses three times a day (over-the-counter)  - Use nasal saline spray twice a day (over-the-counter)    Please return to the Emergency Department should you experience any of the following:  - Recurrent bleeding, unable to control at home  - Passing out/fainting  - Fever, unexplained weight loss, night sweats  - Blood in stool or in urine  - New weakness, fatigue, or fainting  - Any new concerning symptoms

## 2022-04-10 NOTE — ED PROVIDER NOTE - PRINCIPAL DIAGNOSIS
We want to give the best care possible. If you receive a [employer] Ganey survey from our office, please take the time to fill out the survey and return it in the envelope provided. Your feedback helps us know how we are doing and we really appreciate it.Thank you.    
Mild epistaxis

## 2022-04-10 NOTE — ED PROVIDER NOTE - CLINICAL SUMMARY MEDICAL DECISION MAKING FREE TEXT BOX
52 y/o M w/ no hx of thrombophilia presents to the ED w/ recurrent nose bleeds for 1 week. Feels it is exacerbating and is unable to take care of himself at home because of the nose bleeds. Pt is already followed by ENT (Dr. Pack) and has still has bleeding. No signs of anemia. On exam pt otherwise well appearing, current hemostatic, no epistaxis. Given duration of symptoms and course of events will consult ENT for second opinion. Currently no acute intervention required. Will defer labs and imaging until ENT consults. 50 y/o M w/ no hx of thrombophilia presents to the ED w/ recurrent nose bleeds for 1 week. Feels it is exacerbating and is unable to take care of himself at home because of the nose bleeds. Pt is already followed by ENT (Dr. Pack) and has still has bleeding. No signs of anemia, no history of thrombocytopenia or hemophilia. On exam pt otherwise well appearing, current hemostatic, no epistaxis. Given duration of symptoms and course of events will consult ENT for second opinion. Currently no acute intervention required. Lab work from yesterday with normal cbc, normal coags and lytes, will defer labs and imaging until ENT recs

## 2022-04-10 NOTE — ED PROVIDER NOTE - PATIENT PORTAL LINK FT
You can access the FollowMyHealth Patient Portal offered by [facility] by registering at the following website: [email or web address]. By joining [employer]’s FollowMyHealth portal, you will also be able to view your health information using other applications (apps) compatible with our system.

## 2022-04-10 NOTE — ED PROVIDER NOTE - PROGRESS NOTE DETAILS
Evgeny Whipple, PGY-2: Pt reexamined at bedside, resting comfortably, vitals stable. Still hemostatic. Seen by ENT and recs appreciated, will d/c home to f/u outpatient

## 2022-12-07 ENCOUNTER — NON-APPOINTMENT (OUTPATIENT)
Age: 52
End: 2022-12-07

## 2022-12-12 ENCOUNTER — NON-APPOINTMENT (OUTPATIENT)
Age: 52
End: 2022-12-12

## 2023-02-03 ENCOUNTER — NON-APPOINTMENT (OUTPATIENT)
Age: 53
End: 2023-02-03

## 2024-01-04 ENCOUNTER — NON-APPOINTMENT (OUTPATIENT)
Age: 54
End: 2024-01-04

## 2024-01-27 ENCOUNTER — NON-APPOINTMENT (OUTPATIENT)
Age: 54
End: 2024-01-27

## 2025-03-29 ENCOUNTER — NON-APPOINTMENT (OUTPATIENT)
Age: 55
End: 2025-03-29

## 2025-04-28 ENCOUNTER — TRANSCRIPTION ENCOUNTER (OUTPATIENT)
Age: 55
End: 2025-04-28

## 2025-04-28 ENCOUNTER — INPATIENT (INPATIENT)
Facility: HOSPITAL | Age: 55
LOS: 0 days | Discharge: HOME | DRG: 313 | End: 2025-04-28
Attending: STUDENT IN AN ORGANIZED HEALTH CARE EDUCATION/TRAINING PROGRAM | Admitting: STUDENT IN AN ORGANIZED HEALTH CARE EDUCATION/TRAINING PROGRAM
Payer: MEDICAID

## 2025-04-28 VITALS
HEART RATE: 87 BPM | SYSTOLIC BLOOD PRESSURE: 137 MMHG | RESPIRATION RATE: 16 BRPM | DIASTOLIC BLOOD PRESSURE: 91 MMHG | OXYGEN SATURATION: 96 %

## 2025-04-28 VITALS
OXYGEN SATURATION: 100 % | WEIGHT: 207.01 LBS | DIASTOLIC BLOOD PRESSURE: 88 MMHG | RESPIRATION RATE: 18 BRPM | TEMPERATURE: 98 F | HEIGHT: 74 IN | HEART RATE: 76 BPM | SYSTOLIC BLOOD PRESSURE: 140 MMHG

## 2025-04-28 DIAGNOSIS — R07.9 CHEST PAIN, UNSPECIFIED: ICD-10-CM

## 2025-04-28 LAB
ALBUMIN SERPL ELPH-MCNC: 4.1 G/DL — SIGNIFICANT CHANGE UP (ref 3.3–5)
ALP SERPL-CCNC: 84 U/L — SIGNIFICANT CHANGE UP (ref 40–120)
ALT FLD-CCNC: 19 U/L — SIGNIFICANT CHANGE UP (ref 10–45)
ANION GAP SERPL CALC-SCNC: 12 MMOL/L — SIGNIFICANT CHANGE UP (ref 5–17)
APTT BLD: 31.6 SEC — SIGNIFICANT CHANGE UP (ref 26.1–36.8)
AST SERPL-CCNC: 18 U/L — SIGNIFICANT CHANGE UP (ref 10–40)
BASOPHILS # BLD AUTO: 0.05 K/UL — SIGNIFICANT CHANGE UP (ref 0–0.2)
BASOPHILS NFR BLD AUTO: 0.7 % — SIGNIFICANT CHANGE UP (ref 0–2)
BILIRUB SERPL-MCNC: 0.6 MG/DL — SIGNIFICANT CHANGE UP (ref 0.2–1.2)
BUN SERPL-MCNC: 15 MG/DL — SIGNIFICANT CHANGE UP (ref 7–23)
CALCIUM SERPL-MCNC: 9.5 MG/DL — SIGNIFICANT CHANGE UP (ref 8.4–10.5)
CHLORIDE SERPL-SCNC: 105 MMOL/L — SIGNIFICANT CHANGE UP (ref 96–108)
CO2 SERPL-SCNC: 26 MMOL/L — SIGNIFICANT CHANGE UP (ref 22–31)
CREAT SERPL-MCNC: 1.32 MG/DL — HIGH (ref 0.5–1.3)
EGFR: 63 ML/MIN/1.73M2 — SIGNIFICANT CHANGE UP
EGFR: 63 ML/MIN/1.73M2 — SIGNIFICANT CHANGE UP
EOSINOPHIL # BLD AUTO: 0.36 K/UL — SIGNIFICANT CHANGE UP (ref 0–0.5)
EOSINOPHIL NFR BLD AUTO: 5.3 % — SIGNIFICANT CHANGE UP (ref 0–6)
GLUCOSE SERPL-MCNC: 103 MG/DL — HIGH (ref 70–99)
HCT VFR BLD CALC: 46.2 % — SIGNIFICANT CHANGE UP (ref 39–50)
HGB BLD-MCNC: 15 G/DL — SIGNIFICANT CHANGE UP (ref 13–17)
IMM GRANULOCYTES NFR BLD AUTO: 0.6 % — SIGNIFICANT CHANGE UP (ref 0–0.9)
INR BLD: 1.07 RATIO — SIGNIFICANT CHANGE UP (ref 0.85–1.16)
LYMPHOCYTES # BLD AUTO: 1.99 K/UL — SIGNIFICANT CHANGE UP (ref 1–3.3)
LYMPHOCYTES # BLD AUTO: 29.5 % — SIGNIFICANT CHANGE UP (ref 13–44)
MCHC RBC-ENTMCNC: 28.2 PG — SIGNIFICANT CHANGE UP (ref 27–34)
MCHC RBC-ENTMCNC: 32.5 G/DL — SIGNIFICANT CHANGE UP (ref 32–36)
MCV RBC AUTO: 87 FL — SIGNIFICANT CHANGE UP (ref 80–100)
MONOCYTES # BLD AUTO: 0.61 K/UL — SIGNIFICANT CHANGE UP (ref 0–0.9)
MONOCYTES NFR BLD AUTO: 9 % — SIGNIFICANT CHANGE UP (ref 2–14)
NEUTROPHILS # BLD AUTO: 3.7 K/UL — SIGNIFICANT CHANGE UP (ref 1.8–7.4)
NEUTROPHILS NFR BLD AUTO: 54.9 % — SIGNIFICANT CHANGE UP (ref 43–77)
NRBC BLD AUTO-RTO: 0 /100 WBCS — SIGNIFICANT CHANGE UP (ref 0–0)
NT-PROBNP SERPL-SCNC: 569 PG/ML — HIGH (ref 0–300)
PLATELET # BLD AUTO: 108 K/UL — LOW (ref 150–400)
POTASSIUM SERPL-MCNC: 3.8 MMOL/L — SIGNIFICANT CHANGE UP (ref 3.5–5.3)
POTASSIUM SERPL-SCNC: 3.8 MMOL/L — SIGNIFICANT CHANGE UP (ref 3.5–5.3)
PROT SERPL-MCNC: 7.5 G/DL — SIGNIFICANT CHANGE UP (ref 6–8.3)
PROTHROM AB SERPL-ACNC: 12.2 SEC — SIGNIFICANT CHANGE UP (ref 9.9–13.4)
RBC # BLD: 5.31 M/UL — SIGNIFICANT CHANGE UP (ref 4.2–5.8)
RBC # FLD: 13.1 % — SIGNIFICANT CHANGE UP (ref 10.3–14.5)
SODIUM SERPL-SCNC: 143 MMOL/L — SIGNIFICANT CHANGE UP (ref 135–145)
TROPONIN T, HIGH SENSITIVITY RESULT: 14 NG/L — SIGNIFICANT CHANGE UP (ref 0–51)
WBC # BLD: 6.75 K/UL — SIGNIFICANT CHANGE UP (ref 3.8–10.5)
WBC # FLD AUTO: 6.75 K/UL — SIGNIFICANT CHANGE UP (ref 3.8–10.5)

## 2025-04-28 PROCEDURE — C1894: CPT

## 2025-04-28 PROCEDURE — 85025 COMPLETE CBC W/AUTO DIFF WBC: CPT

## 2025-04-28 PROCEDURE — 85730 THROMBOPLASTIN TIME PARTIAL: CPT

## 2025-04-28 PROCEDURE — 85610 PROTHROMBIN TIME: CPT

## 2025-04-28 PROCEDURE — 36415 COLL VENOUS BLD VENIPUNCTURE: CPT

## 2025-04-28 PROCEDURE — 71045 X-RAY EXAM CHEST 1 VIEW: CPT | Mod: 26

## 2025-04-28 PROCEDURE — C1887: CPT

## 2025-04-28 PROCEDURE — 99285 EMERGENCY DEPT VISIT HI MDM: CPT | Mod: 25

## 2025-04-28 PROCEDURE — 84484 ASSAY OF TROPONIN QUANT: CPT

## 2025-04-28 PROCEDURE — 71045 X-RAY EXAM CHEST 1 VIEW: CPT

## 2025-04-28 PROCEDURE — 99285 EMERGENCY DEPT VISIT HI MDM: CPT

## 2025-04-28 PROCEDURE — 93458 L HRT ARTERY/VENTRICLE ANGIO: CPT

## 2025-04-28 PROCEDURE — C1769: CPT

## 2025-04-28 PROCEDURE — 83880 ASSAY OF NATRIURETIC PEPTIDE: CPT

## 2025-04-28 PROCEDURE — 93005 ELECTROCARDIOGRAM TRACING: CPT

## 2025-04-28 PROCEDURE — 93010 ELECTROCARDIOGRAM REPORT: CPT

## 2025-04-28 PROCEDURE — 80053 COMPREHEN METABOLIC PANEL: CPT

## 2025-04-28 RX ORDER — METOPROLOL SUCCINATE 50 MG/1
1 TABLET, EXTENDED RELEASE ORAL
Qty: 90 | Refills: 0
Start: 2025-04-28 | End: 2025-07-26

## 2025-04-28 RX ORDER — METOPROLOL SUCCINATE 50 MG/1
25 TABLET, EXTENDED RELEASE ORAL DAILY
Refills: 0 | Status: DISCONTINUED | OUTPATIENT
Start: 2025-04-28 | End: 2025-04-28

## 2025-04-28 RX ORDER — ALBUTEROL SULFATE 2.5 MG/3ML
2 VIAL, NEBULIZER (ML) INHALATION
Refills: 0 | DISCHARGE

## 2025-04-28 RX ORDER — ASPIRIN 325 MG
1 TABLET ORAL
Refills: 0 | DISCHARGE

## 2025-04-28 RX ORDER — LORATADINE 5 MG/5ML
1 SOLUTION ORAL
Refills: 0 | DISCHARGE

## 2025-04-28 RX ORDER — ROSUVASTATIN CALCIUM 20 MG/1
1 TABLET, FILM COATED ORAL
Refills: 0 | DISCHARGE

## 2025-04-28 RX ORDER — ROSUVASTATIN CALCIUM 20 MG/1
20 TABLET, FILM COATED ORAL AT BEDTIME
Refills: 0 | Status: DISCONTINUED | OUTPATIENT
Start: 2025-04-28 | End: 2025-04-28

## 2025-04-28 RX ORDER — BUDESONIDE AND FORMOTEROL FUMARATE DIHYDRATE 80; 4.5 UG/1; UG/1
2 AEROSOL RESPIRATORY (INHALATION)
Refills: 0 | DISCHARGE

## 2025-04-28 RX ORDER — DEXLANSOPRAZOLE 60 MG/1
1 CAPSULE, DELAYED RELEASE ORAL
Refills: 0 | DISCHARGE

## 2025-04-28 RX ORDER — ASPIRIN 325 MG
81 TABLET ORAL DAILY
Refills: 0 | Status: DISCONTINUED | OUTPATIENT
Start: 2025-04-28 | End: 2025-04-28

## 2025-04-28 RX ORDER — VORTIOXETINE 20 MG/1
1 TABLET, FILM COATED ORAL
Refills: 0 | DISCHARGE

## 2025-04-28 RX ORDER — ALBUTEROL SULFATE 2.5 MG/3ML
2 VIAL, NEBULIZER (ML) INHALATION EVERY 6 HOURS
Refills: 0 | Status: DISCONTINUED | OUTPATIENT
Start: 2025-04-28 | End: 2025-04-28

## 2025-04-28 RX ORDER — PRASUGREL HYDROCHLORIDE 10 MG/1
10 TABLET, COATED ORAL DAILY
Refills: 0 | Status: DISCONTINUED | OUTPATIENT
Start: 2025-04-28 | End: 2025-04-28

## 2025-04-28 RX ORDER — LEVOTHYROXINE SODIUM 300 MCG
88 TABLET ORAL DAILY
Refills: 0 | Status: DISCONTINUED | OUTPATIENT
Start: 2025-04-28 | End: 2025-04-28

## 2025-04-28 RX ORDER — LEVOTHYROXINE SODIUM 300 MCG
1 TABLET ORAL
Refills: 0 | DISCHARGE

## 2025-04-28 RX ORDER — ASPIRIN 325 MG
162 TABLET ORAL ONCE
Refills: 0 | Status: COMPLETED | OUTPATIENT
Start: 2025-04-28 | End: 2025-04-28

## 2025-04-28 RX ORDER — PRASUGREL HYDROCHLORIDE 10 MG/1
1 TABLET, COATED ORAL
Refills: 0 | DISCHARGE

## 2025-04-28 RX ADMIN — METOPROLOL SUCCINATE 25 MILLIGRAM(S): 50 TABLET, EXTENDED RELEASE ORAL at 16:18

## 2025-04-28 RX ADMIN — Medication 500 MILLILITER(S): at 10:42

## 2025-04-28 RX ADMIN — Medication 75 MILLILITER(S): at 11:03

## 2025-04-28 RX ADMIN — Medication 162 MILLIGRAM(S): at 09:25

## 2025-04-28 NOTE — DISCHARGE NOTE NURSING/CASE MANAGEMENT/SOCIAL WORK - NSDCPEFALRISK_GEN_ALL_CORE
For information on Fall & Injury Prevention, visit: [email or web address] OR  [email or web address] OR  https://www.cdc.gov/steadi/patient.html

## 2025-04-28 NOTE — DISCHARGE NOTE PROVIDER - HOSPITAL COURSE
53 y/o M w/ PMHx of HTN, HLD, CAD c/b MI s/p stents x4 2/2025 @ [facility], hypothyroidism, anxiety/depression, asthma, GERD presented to the hospital c/o midsternal chest pain/pressure. In ER: labs unremarkable (WBC 6.77, H/H 15.0/46.2, plt 108, Na 143, Na 3.8, Cl 105, HCO3 26, BUN/Cr 15/1.32, gluc 103, trop 14, ). CXR w/o PTX, pulmonary edema or PNA. EKG NSR. Admitted or management of unstable angina.    On 4/28/25, pt underwent LHC revealing  of LCx next to prior stent via RRA. Unable to cross lesion. Post-procedure, radial band removed per protocol w/o any complications; site stable -- soft, nontender, no hematoma. Continue ASA 81mg qd, Effient 10mg qd, Rosuvastatin 20mg qhs. Start Metoprolol succinate 25mg qd. Denies any complaints at this time. Patient remained hemodynamically stable, neurovascularly intact and chest pain free post-procedure. Remained overnight for observation and pain control. Patient has been medically cleared for discharge as per Dr. Guerrero. Patient has been given appropriate discharge instructions including medication regimen, access site management and follow up. Medications that patient needs refills on (+/- new medications) have been e-prescribed to preferred pharmacy. Patient will f/u with Dr. Guerrero in 1-2 weeks for further management.     VSS  Gen: NAD, A&O x3  Cards: RRR, clear S1 and S2 without murmur  Pulm: CTA B/L without w/r/r  Vascular: Right wrist w/o hematoma, ooze or bruit. Peripheral pulses 2+ B/L  Abd: soft, NT  Ext: no LE edema or ulcerations B/L

## 2025-04-28 NOTE — DISCHARGE NOTE PROVIDER - CARE PROVIDER_API CALL
Rickie Richards  Cardiovascular Disease  [address]  [place] [address]  Phone: [phone]  Fax: [phone]  Established Patient  Follow Up Time: 2 weeks    Gregorio Guerrero  Interventional Cardiology  [address], [address]  [place] [address]  Phone: [phone]  Fax: [phone]  Established Patient  Follow Up Time: 2 weeks

## 2025-04-28 NOTE — ED ADULT TRIAGE NOTE - CHIEF COMPLAINT QUOTE
Chest pain that started over the week. Spoke to his cardiologist who referred to the ED. PT has hx of MI with stents in 02/2025 on ASA/Plavix.

## 2025-04-28 NOTE — DISCHARGE NOTE PROVIDER - CARE PROVIDERS DIRECT ADDRESSES
[email or web address]

## 2025-04-28 NOTE — DISCHARGE NOTE NURSING/CASE MANAGEMENT/SOCIAL WORK - FINANCIAL ASSISTANCE
[facility] provides services at a reduced cost to those who are determined to be eligible through [facility]’s financial assistance program. Information regarding [facility]’s financial assistance program can be found by going to [email or web address] or by calling [phone].

## 2025-04-28 NOTE — DISCHARGE NOTE PROVIDER - NSDCCPTREATMENT_GEN_ALL_CORE_FT
PRINCIPAL PROCEDURE  Procedure: Left heart catheterization  Findings and Treatment: 4/28/25: LCx , unable to cross lesion via RRA

## 2025-04-28 NOTE — DISCHARGE NOTE PROVIDER - NSDCCPCAREPLAN_GEN_ALL_CORE_FT
PRINCIPAL DISCHARGE DIAGNOSIS  Diagnosis: Chest pain  Assessment and Plan of Treatment: You came to the hospital because you were experiencing chest pain. Your troponin level (a cardiac enzyme that if positive can indicate cardiac injury) was negative. Your chest x-ray was negative. You were seen by the cardiology team and underwent a cardiac catheterization which revealed blockage in one of the arteries where you had a prior stent placed. You will start Metoprolol 25mg once a day. Continue taking all medications as prescribed.   The procedure was done through the wrist. Please avoid any heavy lifting (no more than 3 to 5 lbs), strenuous activity, bending, straining, or unnecessary stair climbing for 2 weeks. No driving for 2 days. You may shower 24 hours following the procedure but avoid baths/swimming for 1 week. If you develop any swelling, bleeding, hardening of the skin (hematoma formation), acute pain, numbness/tingling in your arm or leg, or have any questions/concerns regarding your procedure, please call [facility] [phone]  Please make a follow up appointment with your cardiologist within 1-2 weeks of your discharge. All of your prescriptions have been sent electronically to your pharmacy.      SECONDARY DISCHARGE DIAGNOSES  Diagnosis: HTN (hypertension)  Assessment and Plan of Treatment: You have high blood pressure. Continue taking your blood pressure medications as prescribed. Eat a heart healthy diet with low salt; exercise regularly (if cleared by your primary care doctor or cardiologist). Maintain a heart healthy weight and include healthy ways to manage stress. If you smoke, quit. If you need assistance to help you stop smoking, please use the following resource: [facility] [employer] for [employer] – ([phone]). Follow up with your primary care doctor to continue having your blood pressure checked on a continual basis.    Diagnosis: HLD (hyperlipidemia)  Assessment and Plan of Treatment: LDL<70   Goal is to keep LDL<70. Continue with your cholesterol medications as prescribed. Eat a heart healthy diet that is low in saturated fats and salt, and includes whole grains, fruits, vegetables and lean protein; exercise regularly (consult with your physician or cardiologist first); maintain a heart healthy weight; if you smoke - quit (A resource to help you stop smoking is the [facility] [employer] for [employer] – phone number [phone].). Continue to follow with your primary physician or cardiologist.

## 2025-04-28 NOTE — DISCHARGE NOTE PROVIDER - NSDCFUADDINST_GEN_ALL_CORE_FT
Wound Care:   the day AFTER your procedure remove bandage GENTLY, and clean using  mild soap and gentle warm, water stream, pat dry. leave OPEN to air. YOU MAY SHOWER   DO NOT apply lotions, creams, ointments, powder, perfumes to your incision site  DO NOT SOAK your site for 1 week ( no baths, no pools, no tubs, etc...)  Check  your groin and/or wrist daily. A small amount of bruising, and soreness are normal    ACTIVITY: for 24 hours   - DO NOT DRIVE  - DO NOT make any important decisions or sign legal documents   - DO NOT operate heavy machineries   - you may resume sexual activity in 48 hours, unless otherwise instructed by your cardiologist     If your procedure was done through the WRIST: for the NEXT 3 DAYS  - avoid pushing, pulling, with that affected wrist   - avoid repeated movement of that hand and wrist ( eg: typing, hammering)  - DO NOT LIFT anything more than 5 lbs     If your procedure was done through the GROIN: for the NEXT 5 DAYS  - Limit climbing stairs, DO NOT soak in bathtub or pool  - no strenuous activities, pushing, pulling, straining  - Do not lift anything 10lbs or heavier     MEDICATION:   take your medications as explained ( see discharge paperwork)   If you received a STENT, you will be taking antiplatelet medications to KEEP YOUR STENT OPEN ( eg: Aspirin, Plavix, Brilinta, Effient, etc).  Take as prescribed DO NOT STOP taking them without consulting with your cardiologist first.     Follow heart healthy diet recommended by your doctor, , if you smoke STOP SMOKING ( may call [phone] for center of tobacco control if you need assistance)     CALL your doctor to make appointment in 2 WEEKS     ***CALL YOUR DOCTOR***  if you experience: fever, chills, body aches, or severe pain, swelling, redness, heat or yellow discharge at incision site  If you experience Bleeding or excruciating pain at the procedural site, swelling (golf ball size) at your procedural site  If you experience CHEST PAIN  If you experience extremity numbness, tingling, temperature change (of your procedural site)   If you are unable to reach your doctor, you may contact:   -Cardiology Office at [facility] at [phone] or   - [facility] [phone]  - [facility] [phone]

## 2025-04-28 NOTE — ED CLERICAL - NS ED CLERK UNITS
-- DO NOT REPLY / DO NOT REPLY ALL --  -- Message is from the [facility]--    COVID-19 Universal Screening: Negative    Referral Request  Name of Specialist: Dr. Keren orantes   Provider's specialty: Audiology    Medical condition for referral:  Audiology test/ hearing exam    Is this a NEW request?: no      Referral ordered by: Trixie Santiago      Insurance type:       Payor: MEDICARE / Plan: PARTA AND B / Product Type: MEDICARE      Preferred Delivery Method   Fax - number to send to: [phone]    Caller Information       Type Contact Phone    06/29/2020 04:01 PM Phone (Incoming) [employer] (Self) [phone] (M)          Alternative phone number: [phone]-EpDr.Brittny orantes  office number    Turnaround time given to caller:   \"This message will be sent to [state Provider's full name]. The clinical team will return your call as soon as they review your message. Typically, it takes 3 business days to process referral requests.\"   APER

## 2025-04-28 NOTE — H&P CARDIOLOGY - NSICDXPASTMEDICALHX_GEN_ALL_CORE_FT
PAST MEDICAL HISTORY:  Acute myocardial infarction     Anxiety     Asthma     CAD (coronary artery disease)     Depression     GERD (gastroesophageal reflux disease)     HLD (hyperlipidemia)     HTN (hypertension)     Hypothyroidism     OCD (obsessive compulsive disorder)

## 2025-04-28 NOTE — PATIENT PROFILE ADULT - NSPROPTRIGHTNOTIFY_GEN_A_NUR
CIW:  MELISSA GRIFFIN  TIME:  12:13 PM       CASE COLLATERAL CONSULTATION WITH PATIENT'S MOTHER  AYANA KNUTSON [phone]    THE MOTHER IS LOCATED IN [place].      THE PATIENT IS A 15-YEAR-OLD FEMALE WHO PRESENTED TO THE ED AFTER GETTING INTO A VERBAL AND PHYSICAL ALTERCATION WITH HER AUNT.      PER INFORMATION OBTAINED FROM MOTHER, PATIENT HAS BEEN MAKING DECISIONS THAT HAVE NOT BEEN APPROPRIATE.  PATIENT HAS BEEN SUSPENDED FROM SCHOOL FOR FIVE DAYS FOR SENDING INAPPROPRIATE PICTURES.  PATIENT HAS BEEN ASSOCIATING WITH BOYS THAT MOTHER DOES NOT APPROVE OF AND LETTING ANOTHER ADOLESCENT CLIMB OVER THE [place] AND ENTER HER HOME.      MOTHER DID INFORM THIS CIW THAT SHE DID RECEIVE A CALL FROM THE  THAT THE PATIENT SENT THE EMAIL STATING THAT SHE WAS SUICIDAL AND WANTED TO KILL HERSELF.         declines

## 2025-04-28 NOTE — H&P CARDIOLOGY - HISTORY OF PRESENT ILLNESS
Cardiologist: Dr. Richards  Pharmacy: [facility] ([address] [place], [place] [address])    53 y/o M w/ PMHx of HTN, HLD, CAD c/b MI s/p stents x4 2/2025 @ [facility], hypothyroidism, anxiety/depression, asthma, GERD presented to the hospital c/o midsternal chest pain/pressure which began 4/25/25. Reports pain is present at rest, but worse on exertion and a/w SOB, diaphoresis, ligtheadedness. Also reports pain occasionally radiates down right arm, which was similar to symptoms he experienced when he had his heart attack in February. Endorses good compliance w/ ASA/Effient, last dose 4/28AM. In light of pt's risk factors, CCS class III anginal symptoms; pt referred to [facility] for recommended cardiac catheterization w/ possible intervention if clinically indicated. Denies headaches, changes in vision, dizziness, palpitations, abdominal pain, N/V/D, leg pain/edema, hematuria, BRBPR, melena or any other complaints.    Cardiologist: Dr. Richards  Pharmacy: [facility] ([address] [place], [place] [address])    53 y/o M w/ PMHx of HTN, HLD, CAD c/b MI s/p stents x4 2/2025 @ [facility], hypothyroidism, anxiety/depression, asthma, GERD presented to the hospital c/o midsternal chest pain/pressure which began 4/25/25. Reports pain is present at rest, but worse on exertion and a/w SOB, diaphoresis, lightheadedness Also reports pain occasionally radiates down right arm, which was similar to symptoms he experienced when he had his heart attack in February. Endorses good compliance w/ ASA/Effient, last dose 4/28AM. In ER: labs unremarkable (WBC 6.77, H/H 15.0/46.2, plt 108, Na 143, Na 3.8, Cl 105, HCO3 26, BUN/Cr 15/1.32, gluc 103, trop 14, ). CXR w/o PTX, pulmonary edema or PNA. EKG NSR. In light of pt's risk factors, CCS class III anginal symptoms; pt admitted to [facility] for recommended cardiac catheterization w/ possible intervention if clinically indicated. Denies headaches, changes in vision, dizziness, palpitations, abdominal pain, N/V/D, leg pain/edema, hematuria, BRBPR, melena or any other complaints.

## 2025-04-28 NOTE — DISCHARGE NOTE PROVIDER - NSDCMRMEDTOKEN_GEN_ALL_CORE_FT
Albuterol (Eqv-ProAir HFA) 90 mcg/inh inhalation aerosol: 2 puff(s) inhaled every 6 hours as needed for  shortness of breath and/or wheezing  aspirin 81 mg oral delayed release capsule: 1 tab(s) orally once a day  dexlansoprazole 60 mg oral delayed release capsule: 1 cap(s) orally once a day  Effient 10 mg oral tablet: 1 tab(s) orally once a day  famotidine 40 mg oral tablet: 1 tab(s) orally once a day (at bedtime)  levothyroxine 88 mcg (0.088 mg) oral tablet: 1 tab(s) orally once a day  loratadine 10 mg oral tablet: 1 tab(s) orally once a day  metoprolol succinate 25 mg oral tablet, extended release: 1 tab(s) orally once a day  rosuvastatin 20 mg oral tablet: 1 tab(s) orally once a day (at bedtime)  Symbicort 160 mcg-4.5 mcg/inh inhalation aerosol: 2 puff(s) inhaled once a day  Trintellix 5 mg oral tablet: 1 tab(s) orally once a day

## 2025-04-28 NOTE — DISCHARGE NOTE PROVIDER - PROVIDER TOKENS
PROVIDER:[TOKEN:[[id number]],FOLLOWUP:[2 weeks],ESTABLISHEDPATIENT:[T]],PROVIDER:[TOKEN:[[id number]],FOLLOWUP:[2 weeks],ESTABLISHEDPATIENT:[T]]

## 2025-04-28 NOTE — H&P CARDIOLOGY - NS ATTEST RISK PROBLEM GEN_ALL_CORE FT
Acute on chronic exacerbation of chronic medical problem necessitating inpatient evaluation and services.  Personal review of imaging, labs, consultant notes.  Coordination of care including daily conversations with inpatient and outpatient providers.

## 2025-04-28 NOTE — ED ADULT NURSE NOTE - NS ED NURSE LEVEL OF CONSCIOUSNESS AFFECT
Calm

## 2025-04-28 NOTE — H&P CARDIOLOGY - NS ATTEND AMEND GEN_ALL_CORE FT
Agree with above.  Patient referred to ED by his outpatient cardiologist for accelerated anginal symptoms.  Hx of recent stents to LCx and RCA.  Concern for unstable angina.  Plan for cardiac catheterization to evaluate coronary anatomy.    The discussion and evaluation from today's encounter led to the decision to perform cardiac catheterization as a separate billable service on the same day as admission encounter.    ________________  Gregorio Guerrero MD  Interventional & Structural Cardiology

## 2025-04-28 NOTE — DISCHARGE NOTE PROVIDER - ATTENDING DISCHARGE PHYSICAL EXAMINATION:
Patient seen and examined on day of discharge.    Cath earlier today revealing for  LCX.  Unable to cross with workhorse guidewire.  Will obtain cath images from [facility] to map course of closed artery.  Plan for  intervention if he continues to have symptoms despite medical therapies.    Start on beta blocker as first anti-anginal.  Follow-up in interventional cardiology clinic in 2 weeks' time.  This appointment has been requested.    ________________  Gregorio Guerrero MD  Interventional & Structural Cardiology

## 2025-04-28 NOTE — DISCHARGE NOTE NURSING/CASE MANAGEMENT/SOCIAL WORK - PATIENT PORTAL LINK FT
You can access the FollowMyHealth Patient Portal offered by [facility] [place] by registering at the following website: [email or web address]. By joining [facility]’s FollowMyHealth portal, you will also be able to view your health information using other applications (apps) compatible with our system.
Yes

## 2025-04-28 NOTE — ED PROVIDER NOTE - ATTENDING CONTRIBUTION TO CARE
Ariel Macdonald MD, Emergency Medicine Attending Physician:     HPI: 54-year-old male with history of CAD/MI with stents placed in February 2025 at [facility], compliant with aspirin Plavix, who presents with midsternal chest pain/pressure that started on Friday.  Symptoms are present at rest but worsened with exertion, associated with shortness of breath and mild diaphoresis and lightheadedness.  States his symptoms feel slightly different from when he had his MI.  Of note patient had a fall 1 week ago and injured his lower back but was cleared by orthopedist that he saw.  Denies any pleuritic symptoms.  Denies any radiation of pain.    ROS: denies fevers, chills, cough, abdominal pain, nausea, vomiting, syncope, leg pain/swelling, paresthesia, numbness, or weakness.    Exam: Elevated blood pressure but otherwise stable vitals  GEN: In no acute distress, AAOx3  HENT: NCAT, no stridor  EYES: No icterus, PERRLA  RESP: No respiratory distress, CTAB  CV: No tachycardia, normal perfusion  ABD: Abdomen soft, non-tender and non-distended, no rebound, no guarding, no rigidity. No CVA tenderness.  SPINE: C/T/L Spine with no erythema, deformity, swelling, midline spinal tenderness or step off.  Full active and passive ROM. C5-T1 and L2-S2 with normal 5/5 motor strength and sensation to light touch.  No hyperreflexia.  EXT: There is no calf tenderness or leg swelling. Negative Benjie's sign.  Neurovascularly intact in all 4 extremities with 5/5 strength, normal sensation, equal pulses and brisk capillary refill, soft compartments.  NEURO: Cranial nerves III-XII intact, no pronator drift, normal speech and gait.    MDM: Will evaluate patient for ACS. EKG does not show evidence of ST depressions or elevations. Breath sounds symmetric, not likely to be pneumothorax or pneumonia. No signs or symptoms concerning for pulmonary embolism or aortic dissection. Will obtain EKG, CXR and labs including troponin. Will keep patient on cardiac monitor.    My independent interpretation of the EKG shows:  Normal sinus rhythm with rate of 73 bpm, normal axis, isolated T wave inversion in lead III, no ST elevations or depressions.  QTc 423 Ariel Macdonald MD, Emergency Medicine Attending Physician:     HPI: 54-year-old male with history of CAD/MI with stents placed in February 2025 at [facility], compliant with aspirin Plavix, who presents with midsternal chest pain/pressure that started on Friday.  Symptoms are present at rest but worsened with exertion, associated with shortness of breath and mild diaphoresis and lightheadedness.  States his symptoms feel slightly different from when he had his MI.  Of note patient had a fall 1 week ago and injured his lower back but was cleared by orthopedist that he saw.  Denies any pleuritic symptoms.  Denies any radiation of pain.    ROS: denies fevers, chills, cough, abdominal pain, nausea, vomiting, syncope, leg pain/swelling, paresthesia, numbness, or weakness.    Exam: Elevated blood pressure but otherwise stable vitals  GEN: In no acute distress, AAOx3  HENT: NCAT, no stridor  EYES: No icterus, PERRLA  RESP: No respiratory distress, CTAB  CV: No tachycardia, normal perfusion  ABD: Abdomen soft, non-tender and non-distended, no rebound, no guarding, no rigidity. No CVA tenderness.  SPINE: C/T/L Spine with no erythema, deformity, swelling, midline spinal tenderness or step off.  Full active and passive ROM. C5-T1 and L2-S2 with normal 5/5 motor strength and sensation to light touch.  No hyperreflexia.  EXT: There is no calf tenderness or leg swelling. Negative Benjie's sign.  Neurovascularly intact in all 4 extremities with 5/5 strength, normal sensation, equal pulses and brisk capillary refill, soft compartments.  NEURO: Cranial nerves III-XII intact, no pronator drift, normal speech and gait.    MDM: Will evaluate patient for ACS. EKG does not show evidence of ST depressions or elevations. Breath sounds symmetric, not likely to be pneumothorax or pneumonia. No signs or symptoms concerning for pulmonary embolism or aortic dissection. Will obtain EKG, CXR and labs including troponin. Will keep patient on cardiac monitor.    My independent interpretation of the EKG shows:  Normal sinus rhythm with rate of 73 bpm, normal axis, isolated T wave inversion in lead III, no ST elevations or depressions.  QTc 423    Labs reviewed, nonactionable.  Creatinine slightly elevated 1.32, troponin 14 (normal range, proBNP slightly elevated 569.  Chest x-ray with clear lungs.  Patient was seen by cardiology team, who accepted patient to their service.  Plan for left heart catheterization, but no plan for heparinization at this time.    The patient will need to be admitted to the hospital for continued evaluation and management.  Discussed with the accepting physician regarding the initial presentation, diagnostic studies, treatments given in the ED, and current plan of care.  The patient was accepted by and endorsed to the cardiology team.

## 2025-04-28 NOTE — ED ADULT NURSE NOTE - OBJECTIVE STATEMENT
Patient is a 54y male complaining of chest pain. PMH 4 stents placed in Feb for a "silent heart attack", daily aspirin and Plavix. AOx4, complains of having sternal pain and SOB over the weekend. States "I don't feel right in the chest". Pt reports sternal chest discomfort and dyspnea on exertion. with lightheadedness. Denies dizziness, n/v/d, fever, chills, abd pain, numbness, tingling. Breathing is spontaneous, chest rise symmetrical. Speaking in clear and full sentences. EKG completed and placed on cardiac monitor. Abd is soft, nondistended. Skin warm, dry, intact. Equal strength x4 extremities and full ROM. Pt is ambulatory. Placed in gown, bed in low position, rails raised. Safety and comfort measures provided. Patient is a 54y male complaining of chest pain. PMH 4 stents placed in Feb 2025 for a "silent heart attack", daily aspirin and Plavix. AOx4, complains of having sternal pain and SOB over the weekend. States "I don't feel right in the chest". Pt reports sternal chest discomfort and dyspnea on exertion. with lightheadedness. Denies dizziness, n/v/d, fever, chills, abd pain, numbness, tingling. Breathing is spontaneous, chest rise symmetrical. Speaking in clear and full sentences. EKG completed and placed on cardiac monitor. Abd is soft, nondistended. Skin warm, dry, intact. Equal strength x4 extremities and full ROM. Pt is ambulatory. Placed in gown, bed in low position, rails raised. Safety and comfort measures provided.

## 2025-05-02 NOTE — PATIENT PROFILE ADULT - DOES PATIENT HAVE ADVANCE DIRECTIVE
[facility] provides services at a reduced cost to those who are determined to be eligible through [facility]’s financial assistance program. Information regarding [facility]’s financial assistance program can be found by going to [email or web address] or by calling [phone].
No

## 2025-05-21 PROBLEM — I21.9 ACUTE MYOCARDIAL INFARCTION, UNSPECIFIED: Chronic | Status: ACTIVE | Noted: 2025-04-28

## 2025-05-21 PROBLEM — I10 ESSENTIAL (PRIMARY) HYPERTENSION: Chronic | Status: ACTIVE | Noted: 2025-04-28

## 2025-05-21 PROBLEM — E78.5 HYPERLIPIDEMIA, UNSPECIFIED: Chronic | Status: ACTIVE | Noted: 2025-04-28

## 2025-05-21 PROBLEM — I25.10 ATHEROSCLEROTIC HEART DISEASE OF NATIVE CORONARY ARTERY WITHOUT ANGINA PECTORIS: Chronic | Status: ACTIVE | Noted: 2025-04-28

## 2025-05-21 PROBLEM — K21.9 GASTRO-ESOPHAGEAL REFLUX DISEASE WITHOUT ESOPHAGITIS: Chronic | Status: ACTIVE | Noted: 2025-04-28

## 2025-05-30 ENCOUNTER — TRANSCRIPTION ENCOUNTER (OUTPATIENT)
Age: 55
End: 2025-05-30

## 2025-05-30 ENCOUNTER — OUTPATIENT (OUTPATIENT)
Dept: OUTPATIENT SERVICES | Facility: HOSPITAL | Age: 55
LOS: 1 days | End: 2025-05-30
Payer: MEDICAID

## 2025-05-30 VITALS
RESPIRATION RATE: 17 BRPM | TEMPERATURE: 98 F | HEART RATE: 90 BPM | OXYGEN SATURATION: 95 % | SYSTOLIC BLOOD PRESSURE: 122 MMHG | DIASTOLIC BLOOD PRESSURE: 77 MMHG

## 2025-05-30 VITALS
WEIGHT: 210.1 LBS | HEIGHT: 74 IN | HEART RATE: 63 BPM | OXYGEN SATURATION: 98 % | RESPIRATION RATE: 18 BRPM | DIASTOLIC BLOOD PRESSURE: 79 MMHG | SYSTOLIC BLOOD PRESSURE: 127 MMHG | TEMPERATURE: 98 F

## 2025-05-30 DIAGNOSIS — I25.10 ATHEROSCLEROTIC HEART DISEASE OF NATIVE CORONARY ARTERY WITHOUT ANGINA PECTORIS: ICD-10-CM

## 2025-05-30 LAB
ANION GAP SERPL CALC-SCNC: 11 MMOL/L — SIGNIFICANT CHANGE UP (ref 5–17)
BLD GP AB SCN SERPL QL: NEGATIVE — SIGNIFICANT CHANGE UP
BUN SERPL-MCNC: 14 MG/DL — SIGNIFICANT CHANGE UP (ref 7–23)
CALCIUM SERPL-MCNC: 9.3 MG/DL — SIGNIFICANT CHANGE UP (ref 8.4–10.5)
CHLORIDE SERPL-SCNC: 105 MMOL/L — SIGNIFICANT CHANGE UP (ref 96–108)
CO2 SERPL-SCNC: 25 MMOL/L — SIGNIFICANT CHANGE UP (ref 22–31)
CREAT SERPL-MCNC: 1.46 MG/DL — HIGH (ref 0.5–1.3)
EGFR: 56 ML/MIN/1.73M2 — LOW
EGFR: 56 ML/MIN/1.73M2 — LOW
GLUCOSE SERPL-MCNC: 101 MG/DL — HIGH (ref 70–99)
HCT VFR BLD CALC: 43.8 % — SIGNIFICANT CHANGE UP (ref 39–50)
HGB BLD-MCNC: 14.7 G/DL — SIGNIFICANT CHANGE UP (ref 13–17)
MAGNESIUM SERPL-MCNC: 2.1 MG/DL — SIGNIFICANT CHANGE UP (ref 1.6–2.6)
MCHC RBC-ENTMCNC: 28.5 PG — SIGNIFICANT CHANGE UP (ref 27–34)
MCHC RBC-ENTMCNC: 33.6 G/DL — SIGNIFICANT CHANGE UP (ref 32–36)
MCV RBC AUTO: 85 FL — SIGNIFICANT CHANGE UP (ref 80–100)
NRBC BLD AUTO-RTO: 0 /100 WBCS — SIGNIFICANT CHANGE UP (ref 0–0)
PLATELET # BLD AUTO: 106 K/UL — LOW (ref 150–400)
POTASSIUM SERPL-MCNC: 4.7 MMOL/L — SIGNIFICANT CHANGE UP (ref 3.5–5.3)
POTASSIUM SERPL-SCNC: 4.7 MMOL/L — SIGNIFICANT CHANGE UP (ref 3.5–5.3)
RBC # BLD: 5.15 M/UL — SIGNIFICANT CHANGE UP (ref 4.2–5.8)
RBC # FLD: 13 % — SIGNIFICANT CHANGE UP (ref 10.3–14.5)
RH IG SCN BLD-IMP: NEGATIVE — SIGNIFICANT CHANGE UP
SODIUM SERPL-SCNC: 141 MMOL/L — SIGNIFICANT CHANGE UP (ref 135–145)
WBC # BLD: 8.15 K/UL — SIGNIFICANT CHANGE UP (ref 3.8–10.5)
WBC # FLD AUTO: 8.15 K/UL — SIGNIFICANT CHANGE UP (ref 3.8–10.5)

## 2025-05-30 PROCEDURE — C9607: CPT | Mod: LC

## 2025-05-30 PROCEDURE — 86901 BLOOD TYPING SEROLOGIC RH(D): CPT

## 2025-05-30 PROCEDURE — C1874: CPT

## 2025-05-30 PROCEDURE — 85027 COMPLETE CBC AUTOMATED: CPT

## 2025-05-30 PROCEDURE — 86850 RBC ANTIBODY SCREEN: CPT

## 2025-05-30 PROCEDURE — C1887: CPT

## 2025-05-30 PROCEDURE — 93005 ELECTROCARDIOGRAM TRACING: CPT

## 2025-05-30 PROCEDURE — 83735 ASSAY OF MAGNESIUM: CPT

## 2025-05-30 PROCEDURE — 86900 BLOOD TYPING SEROLOGIC ABO: CPT

## 2025-05-30 PROCEDURE — C1725: CPT

## 2025-05-30 PROCEDURE — 93010 ELECTROCARDIOGRAM REPORT: CPT

## 2025-05-30 PROCEDURE — 80048 BASIC METABOLIC PNL TOTAL CA: CPT

## 2025-05-30 PROCEDURE — C1760: CPT

## 2025-05-30 PROCEDURE — C1769: CPT

## 2025-05-30 PROCEDURE — 93458 L HRT ARTERY/VENTRICLE ANGIO: CPT | Mod: 59

## 2025-05-30 PROCEDURE — C1894: CPT

## 2025-05-30 RX ORDER — ROSUVASTATIN CALCIUM 20 MG/1
20 TABLET, FILM COATED ORAL AT BEDTIME
Refills: 0 | Status: ACTIVE | OUTPATIENT
Start: 2025-05-30 | End: 2026-04-28

## 2025-05-30 RX ORDER — PRASUGREL HYDROCHLORIDE 10 MG/1
10 TABLET, COATED ORAL DAILY
Refills: 0 | Status: ACTIVE | OUTPATIENT
Start: 2025-05-31 | End: 2026-04-29

## 2025-05-30 RX ORDER — METOPROLOL SUCCINATE 50 MG/1
25 TABLET, EXTENDED RELEASE ORAL DAILY
Refills: 0 | Status: ACTIVE | OUTPATIENT
Start: 2025-05-30 | End: 2026-04-28

## 2025-05-30 RX ORDER — LEVOTHYROXINE SODIUM 300 MCG
88 TABLET ORAL DAILY
Refills: 0 | Status: ACTIVE | OUTPATIENT
Start: 2025-05-30 | End: 2026-04-28

## 2025-05-30 RX ORDER — ALBUTEROL SULFATE 2.5 MG/3ML
2 VIAL, NEBULIZER (ML) INHALATION EVERY 6 HOURS
Refills: 0 | Status: ACTIVE | OUTPATIENT
Start: 2025-05-30 | End: 2026-04-28

## 2025-05-30 RX ORDER — ASPIRIN 325 MG
81 TABLET ORAL DAILY
Refills: 0 | Status: ACTIVE | OUTPATIENT
Start: 2025-05-31 | End: 2026-04-29

## 2025-05-30 RX ADMIN — Medication 500 MILLILITER(S): at 13:12

## 2025-05-30 RX ADMIN — Medication 75 MILLILITER(S): at 13:12

## 2025-05-30 NOTE — ASU DISCHARGE PLAN (ADULT/PEDIATRIC) - FINANCIAL ASSISTANCE
[facility] provides services at a reduced cost to those who are determined to be eligible through [facility]’s financial assistance program. Information regarding [facility]’s financial assistance program can be found by going to [email or web address] or by calling [phone].

## 2025-05-30 NOTE — ASU DISCHARGE PLAN (ADULT/PEDIATRIC) - CARE PROVIDER_API CALL
Rickie Richards  Cardiovascular Disease  [address]  [place] [address]  Phone: [phone]  Fax: [phone]  Established Patient  Follow Up Time: 2 weeks

## 2025-05-30 NOTE — ASU PATIENT PROFILE, ADULT - WHEN WAS YOUR LAST VACCINATION? YEAR
Patient discharged to home in stable conditon.  Written and verbal after care 
instructions given. 

Patient verbalizes understanding of instructions. 2024

## 2025-05-30 NOTE — H&P CARDIOLOGY - HISTORY OF PRESENT ILLNESS
Cardiologist: Dr. Richards  Pharmacy: [facility] ([address] [place], [place] [id number])    53 y/o M w/ PMHx of HTN, HLD, CAD c/b MI s/p stents x4 2/2025 @ [facility], hypothyroidism, anxiety/depression, asthma, GERD initially presented to ER 4/28/25 for chest pain, admitted for management of unstable angina. Underwent LHC, revealing ; unable to cross lesion. Pt started on Toprol 25mg qd. Endorses good compliance w/ ASA/Effient, last dose 5/30AM. Pt states he still had chest discomfort and SOB/MCCALL. In light of pt's risk factors, CCS class II anginal symptoms and known significant CAD; pt referred to [facility] for recommended staged PCI of . Denies headaches, changes in vision, dizziness, palpitations, abdominal pain, N/V/D, hematuria, BRBPR, melena or any other complaints.     Review of studies:  LHC 4/28/25: LM normal, LAD mod atherosclerosis, Cx mod atherosclerosis, patent stent in first third Cx, OM1  (filled w/ collaterals), dRCA 50%

## 2025-05-30 NOTE — ASU PATIENT PROFILE, ADULT - FALL HARM RISK - UNIVERSAL INTERVENTIONS
Bed in lowest position, wheels locked, appropriate side rails in place/Call bell, personal items and telephone in reach/Instruct patient to call for assistance before getting out of bed or chair/Non-slip footwear when patient is out of bed/[place] to call system/Physically safe environment - no spills, clutter or unnecessary equipment/Purposeful Proactive Rounding/Room/bathroom lighting operational, light cord in reach

## 2025-05-30 NOTE — ASU DISCHARGE PLAN (ADULT/PEDIATRIC) - ASU DC SPECIAL INSTRUCTIONSFT
We have provided you with a prescription for cardiac rehab which is medically supervised exercise program for your heart and has been shown to improve the quantity and quality of life of people with heart disease like yours. You should attend cardiac rehab 3 times per week for 12 weeks. We have provided you with a list of nearby facilities. Please call your insurance carrier to determine which of these facilities are covered under your plan. Please bring this prescription with you to your follow up appointment with your cardiologist who can then further assist you to enroll into a cardiac rehab program.    Wound Care:   the day AFTER your procedure remove bandage GENTLY, and clean using  mild soap and gentle warm, water stream, pat dry. leave OPEN to air. YOU MAY SHOWER   DO NOT apply lotions, creams, ointments, powder, perfumes to your incision site  DO NOT SOAK your site for 1 week ( no baths, no pools, no tubs, etc...)  Check  your groin and/or wrist daily. A small amount of bruising, and soreness are normal    ACTIVITY: for 24 hours   - DO NOT DRIVE  - DO NOT make any important decisions or sign legal documents   - DO NOT operate heavy machineries   - you may resume sexual activity in 48 hours, unless otherwise instructed by your cardiologist     If your procedure was done through the WRIST: for the NEXT 3 DAYS  - avoid pushing, pulling, with that affected wrist   - avoid repeated movement of that hand and wrist ( eg: typing, hammering)  - DO NOT LIFT anything more than 5 lbs     If your procedure was done through the GROIN: for the NEXT 5 DAYS  - Limit climbing stairs, DO NOT soak in bathtub or pool  - no strenuous activities, pushing, pulling, straining  - Do not lift anything 10lbs or heavier     MEDICATION:   take your medications as explained ( see discharge paperwork)   If you received a STENT, you will be taking antiplatelet medications to KEEP YOUR STENT OPEN ( eg: Aspirin, Plavix, Brilinta, Effient, etc).  Take as prescribed DO NOT STOP taking them without consulting with your cardiologist first.     Follow heart healthy diet recommended by your doctor, , if you smoke STOP SMOKING ( may call [phone] for center of tobacco control if you need assistance)     CALL your doctor to make appointment in 2 WEEKS     ***CALL YOUR DOCTOR***  if you experience: fever, chills, body aches, or severe pain, swelling, redness, heat or yellow discharge at incision site  If you experience Bleeding or excruciating pain at the procedural site, swelling (golf ball size) at your procedural site  If you experience CHEST PAIN  If you experience extremity numbness, tingling, temperature change (of your procedural site)   If you are unable to reach your doctor, you may contact:   -Cardiology Office at [facility] at [phone] or   - [facility] [phone]  - [facility] [phone]

## 2025-09-05 ENCOUNTER — NON-APPOINTMENT (OUTPATIENT)
Age: 55
End: 2025-09-05